# Patient Record
Sex: MALE | Race: WHITE | NOT HISPANIC OR LATINO | Employment: UNEMPLOYED | URBAN - METROPOLITAN AREA
[De-identification: names, ages, dates, MRNs, and addresses within clinical notes are randomized per-mention and may not be internally consistent; named-entity substitution may affect disease eponyms.]

---

## 2018-12-14 ENCOUNTER — APPOINTMENT (OUTPATIENT)
Dept: RADIOLOGY | Facility: MEDICAL CENTER | Age: 33
End: 2018-12-14
Attending: EMERGENCY MEDICINE
Payer: MEDICAID

## 2018-12-14 ENCOUNTER — HOSPITAL ENCOUNTER (EMERGENCY)
Facility: MEDICAL CENTER | Age: 33
End: 2018-12-14
Attending: EMERGENCY MEDICINE
Payer: MEDICAID

## 2018-12-14 VITALS
RESPIRATION RATE: 16 BRPM | WEIGHT: 170 LBS | OXYGEN SATURATION: 98 % | HEART RATE: 60 BPM | DIASTOLIC BLOOD PRESSURE: 94 MMHG | TEMPERATURE: 99.3 F | SYSTOLIC BLOOD PRESSURE: 136 MMHG

## 2018-12-14 DIAGNOSIS — M23.91 INTERNAL DERANGEMENT OF RIGHT KNEE: ICD-10-CM

## 2018-12-14 PROCEDURE — 73560 X-RAY EXAM OF KNEE 1 OR 2: CPT | Mod: RT

## 2018-12-14 PROCEDURE — 99284 EMERGENCY DEPT VISIT MOD MDM: CPT

## 2018-12-14 PROCEDURE — A9270 NON-COVERED ITEM OR SERVICE: HCPCS | Performed by: EMERGENCY MEDICINE

## 2018-12-14 PROCEDURE — 700102 HCHG RX REV CODE 250 W/ 637 OVERRIDE(OP): Performed by: EMERGENCY MEDICINE

## 2018-12-14 PROCEDURE — 302875 HCHG BANDAGE ACE 4 OR 6""

## 2018-12-14 RX ORDER — IBUPROFEN 600 MG/1
600 TABLET ORAL ONCE
Status: COMPLETED | OUTPATIENT
Start: 2018-12-14 | End: 2018-12-14

## 2018-12-14 RX ORDER — HYDROCODONE BITARTRATE AND ACETAMINOPHEN 5; 325 MG/1; MG/1
1 TABLET ORAL EVERY 6 HOURS PRN
Qty: 15 TAB | Refills: 0 | Status: SHIPPED | OUTPATIENT
Start: 2018-12-14 | End: 2018-12-17

## 2018-12-14 RX ORDER — IBUPROFEN 600 MG/1
600 TABLET ORAL EVERY 6 HOURS PRN
Qty: 30 TAB | Refills: 0 | Status: SHIPPED | OUTPATIENT
Start: 2018-12-14

## 2018-12-14 RX ADMIN — IBUPROFEN 600 MG: 600 TABLET, FILM COATED ORAL at 15:15

## 2018-12-14 ASSESSMENT — PAIN SCALES - GENERAL: PAINLEVEL_OUTOF10: 7

## 2018-12-14 NOTE — ED NOTES
"Pt was snowboarding yesterday when he \"took a tumble\", and hyperextended his right knee. Patient states he was able to drive himself home using his left foot, but woke up today with significantly worse pain. Knee is slightly swollen medially, with pain with palpation to medial part of knee. Denies any numbness or tingling.   "

## 2018-12-14 NOTE — ED TRIAGE NOTES
.  Chief Complaint   Patient presents with   • Knee Injury     right knee   • T-5000     To triage in w/c. Pt had snowboard fall yesterday injuring right knee. Unable to bare weight.

## 2018-12-14 NOTE — ED PROVIDER NOTES
"ED Provider Note    Scribed for Amelie Myers M.D. by Mary Young. 12/14/2018, 1:04 PM.    Means of arrival: wheel chair  History obtained from: patient   History limited by: none     CHIEF COMPLAINT  Chief Complaint   Patient presents with   • Knee Injury     right knee   • T-5000       HPI  Kraig Mendez is a 33 y.o. male who presents to the Emergency Department for evaluation of right knee pain which began yesterday. Patient was snowboarding when he accidentally fell and \"tumbled down the hill\".  Patient states he felt a pop in his right knee.  Patient reports pain is located mainly on the medial aspect of the right knee, achy, nonradiating, and associated with mild tingling to his right fourth and fifth toes. His pain is exacerbated with palpation and movement. He was not wearing a helmet but states he did not hit his head during the incident. Patient has not taken any medication for pain relief. No right hip pain, thigh, foot, or right ankle pain. No other known injuries.  Patient states he has been unable to bear weight.      REVIEW OF SYSTEMS  Pertinent positives include right knee pain, numbness to right toes. Pertinent negatives include no right hip pain, right ankle pain.   See HPI for further details.     PAST MEDICAL HISTORY   No pertinent history     SURGICAL HISTORY  patient denies any surgical history    SOCIAL HISTORY  Social History   Substance Use Topics   • Smoking status: Current Some Day Smoker   • Smokeless tobacco: Unknown    • Alcohol use No      History   Drug Use No       FAMILY HISTORY  History reviewed. No pertinent family history.    CURRENT MEDICATIONS  Home Medications     Reviewed by Jacque Diaz R.N. (Registered Nurse) on 12/14/18 at 1241  Med List Status: Complete   Medication Last Dose Status        Patient Josh Taking any Medications                       ALLERGIES  No Known Allergies    PHYSICAL EXAM  VITAL SIGNS: /71   Pulse 79   Temp 37.6 °C (99.6 " °F) (Temporal)   Resp 14   Wt 77.1 kg (170 lb)   SpO2 97%   General: WDWN, nontoxic appearing in NAD; A+Ox3; V/S as above.   Skin: warm and dry; good color; no rash   HEENT: NCAT; EOMs intact; PERRL; no scleral icterus   Neck: FROM; nontender without step-offs  Cardiovascular: Regular heart rate and rhythm. No murmurs, rubs, or gallops; pulses 2+ bilaterally radially and DP areas  Lungs: Clear to auscultation with good air movement bilaterally. No wheezes, rhonchi, or rales.   Extremities: Mild warmth and edema over the medial aspect of the right knee, skin intact, no gross effusion, no erythema, able to hold right lower part of his right leg in an extended position against gravity, no divot palpated in the distal right femur, no right hip, right lower leg, or right ankle tenderness, negative anterior drawer, negative varus and valgus.   Neurologic: CNs III-XII grossly intact; speech clear; distal sensation intact; strength 5/5 UE/LEs.  Psychiatric: Appropriate affect, normal mood                                       DIAGNOSTIC STUDIES / PROCEDURES    RADIOLOGY  DX-KNEE 2- RIGHT   Final Result      No evidence of acute fracture or dislocation.        The radiologist's interpretation of all radiological studies have been reviewed by me.    COURSE & MEDICAL DECISION MAKING  Pertinent Labs & Imaging studies reviewed. (See chart for details)    Kraig Mendez is a 33 y.o. male who presents complaining of right knee pain following a snowboarding accident yesterday.  Patient is neurovascularly intact although he does report mild tingling of his toes intermittently.  We both suspect this is likely due to him keeping his lower extremity still secondary to pain.  Patient was advised we may obtain an x-ray here today but not an MRI.  Patient states he called ahead and specifically asked if we perform MRIs in the ER and was told that we do.  I apologized that he was misinformed and advised him that MRIs are on a  case by case basis and obtained for emergent issues such as CVA, etc.  I do suspect an internal derangement injury.    Differential Diagnoses include but are not limited to internal derangement of knee vs fracture.    1:04 PM - Patient seen and examined at bedside. Ordered DX right knee to evaluate his symptoms. Informed patient that we do not typically perform knee MRI's in the ED and he may need an outpatient MRI performed if his x-ray is negative for fracture and symptoms continue but that he may also heal with immobilization and rest over the next 5-7 days.  I advised him that we may facilitate this process for him by obtaining a PCP and referring him to orthopedics.  Patient understands and agrees.     1:10 PM- Called the ER  to establish the patient with a primary care provider.    2:35 PM- Patient's radiology results were negative for fracture.  I ordered a knee immobilizer and crutches as discussed with the patient initially.    2:51 PM- Patient rechecked at bedside. The patient was updated on diagnostic test results.  Patient expressed that he was upset with the placement of the knee immobilizer as it made his pain worse.  We removed the immobilizer.  I offered him Norco for the pain which he accepted.    The patient will be discharged with instructions regarding supportive care and with prescriptions for Norco and Motrin. Discussed indications for seeking immediate medical attention, including fever, increased swelling or pain, or other concerns. Patient was given the opportunity for questions. The patient understands and agrees.        I reviewed prescription monitoring program for patient's narcotic use before prescribing a scheduled drug.The patient will not drink alcohol nor drive with prescribed medications. The patient will return for new or worsening symptoms and is stable at the time of discharge.    Patient's blood pressure was elevated, I believe it is likely secondary to medical  condition. However I have advised home monitoring and if it continues to be 120/80 or higher, advised to followup with primary care physician for blood pressure management.     In prescribing controlled substances to this patient, I certify that I have obtained and reviewed the medical history of Kraig Mendez. I have also made a good krystal effort to obtain applicable records from other providers who have treated the patient and records did not demonstrate any increased risk of substance abuse that would prevent me from prescribing controlled substances.     I have conducted a physical exam and documented it. I have reviewed Mr. Mendez’s prescription history as maintained by the [place] Prescription Monitoring Program.     I have assessed the patient’s risk for abuse, dependency, and addiction using the validated Opioid Risk Tool available at https://www.mdcalc.com/pnnabn-ccvw-fptw-ort-narcotic-abuse.     Given the above, I believe the benefits of controlled substance therapy outweigh the risks. The reasons for prescribing controlled substances include non-narcotic, oral analgesic alternatives have been inadequate for pain control. Accordingly, I have discussed the risk and benefits, treatment plan, and alternative therapies with the patient.     DISPOSITION:  Patient will be discharged home in stable condition.    FOLLOW UP:  [facility], Emergency Dept  [address] [facility] [place] [address] [phone]    As needed, If symptoms worsen    [facility]  [address] [address]  [phone]        Cal Brush M.D.  [address] [facility] [address]  [phone]    Schedule an appointment as soon as possible for a visit in 1 week      OUTPATIENT MEDICATIONS:  New Prescriptions    HYDROCODONE-ACETAMINOPHEN (NORCO) 5-325 MG TAB PER TABLET    Take 1 Tab by mouth every 6 hours as needed (pain) for up to 3 days.    IBUPROFEN (MOTRIN) 600 MG TAB    Take 1  Tab by mouth every 6 hours as needed.     FINAL IMPRESSION  1. Internal derangement of right knee        Mary VALENTE (Scribe), am scribing for, and in the presence of, Amelie Myers M.D..    Electronically signed by: Mary Young (Scribe), 12/14/2018    Amelie VALENTE M.D. personally performed the services described in this documentation, as scribed by Mary Young in my presence, and it is both accurate and complete. E.     The note accurately reflects work and decisions made by me.  Amelie Myers  12/17/2018  10:33 AM

## 2018-12-14 NOTE — ED NOTES
"Patient upset, stated that his knee hurts worse now since the knee immobilizer was placed, patient stated \"you were in there, I said it wouldn't straighten\". Explained to patient, his knee was never forced to straighten, that the immobilizer was properly securely fastened, and allowed bend, and offered to be loosened. Patient able to wiggle toes, states unable to point or flex foot due to pain. Patient stated he wanted emails and phone numbers to file a complaint. Offered multiple times to get the charge nurse to speak with and being given the requested information. Patient refused stated, \"I will get it my self\".  Ace wrap given to patient which he said he would apply himself at home. Patient verbalized understanding of discharge instructions, medications, and follow up plan with ortho and PCP. Paper signed. Patient ambulated with belongings and crutches out of ER. Charge nurse informed of event.   "

## 2018-12-14 NOTE — ED NOTES
Received call from Primary RN regarding pt being upset about how knee immobilizer was placed. Primary RN ( Roseanna ) offered to have charge RN ( myself) come speak with pt , pt declined to speak with charge

## 2018-12-14 NOTE — DISCHARGE INSTRUCTIONS
Return to the ER for fever, increased swelling or pain, or other concerns  Follow-up with orthopedics and to establish care with a primary care provider  Use knee immobilizer and crutches, ice, rest  Take Motrin as needed for pain and swelling  Take Norco as needed for more severe pain that is not controlled by Motrin    You are being prescribed a narcotic. Narcotics are addictive. Please take the narcotic sparingly and only as needed for pain. Do not drink alcohol, operate heavy machinery, or drive while taking a narcotic as it may impair your judgment and motor skills. If the narcotic contains acetaminophen, you should not take other acetaminophen containing products, such as Tylenol, while taking this medication as it may affect your liver.

## 2018-12-14 NOTE — ED NOTES
"Upon placing the knee immobilizer patient did not tolerate, patient stated \"this is worse pain than when it happened, and when I say pain I mean pain, I am no stranger to pain\". Doctor informed and now at patient's bedside.   "

## 2018-12-18 ENCOUNTER — TELEPHONE (OUTPATIENT)
Dept: MEDICAL GROUP | Facility: MEDICAL CENTER | Age: 33
End: 2018-12-18

## 2018-12-18 NOTE — TELEPHONE ENCOUNTER
Left message with patient about no show to appointment today 12/18/18.  Explained that this was his first no show and the no show policy.